# Patient Record
Sex: MALE
[De-identification: names, ages, dates, MRNs, and addresses within clinical notes are randomized per-mention and may not be internally consistent; named-entity substitution may affect disease eponyms.]

---
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
## 2021-06-04 NOTE — XMS
PreManage Notification: SABINE CAMPBELL MRN:[id number]
 
Security Information
 
Security Events
No recent Security Events currently on file
 
 
 
CRITERIA MET
------------
- Group Notification
 
 
CARE PROVIDERS
-------------------------------------------------------------------------------------
MISSAEL HANKINS [facility]     12/18/2019-Current
 
PHONE: [phone]
-------------------------------------------------------------------------------------
 
[facility] has no Care Guidelines for this patient.
Care History
Medical/Surgical
12/18/2019    [facility]
 
      - CHW CALLED AND LEFT PATIENT A VOICEMAIL- PATIENT DOES NOT HAVE A PCP. 
      - PATIENT CALLED BACK- CHW PROVIDED CLINIC INFORMATION IN THE AREA THAT ARE 
      ACCEPTING NEW PATIENTS. PATIENT STATED HE WOULD LOOK INTO IT AND DID NOT WANT
      ASSISTANCE FROM CHW.
01/08/2019    [facility]
 
      - PATIENT DOES NOT HAVE A PCP- 
      - [facility]W IS UNABLE TO CONTACT PATIENT 
      - PLEASE REFER PATIENT TO [facility] IN CLINIC TO ESTABLISH CARE WITH A 
      PROVIDER.
      - NO PCP LETTER HAS BEEN SENT TO PATIENT.
EFelipeD. VISIT COUNT (12 MO.)
-------------------------------------------------------------------------------------
1 [facility]
-------------------------------------------------------------------------------------
TOTAL 1
-------------------------------------------------------------------------------------
NOTE: Visits indicate total known visits.
 
ED/UCC VISIT TRACKING (12 MO.)
-------------------------------------------------------------------------------------
06/04/2021 14:17
[facility] [place]
 
TYPE: Emergency
 
COMPLAINT:
- JAW PAIN
-------------------------------------------------------------------------------------
 
 
INPATIENT VISIT TRACKING (12 MO.)
No inpatient visits to display in this time frame
 
 
[email or web address]

## 2021-06-17 NOTE — XMS
PreManage Notification: SABINE CAMPBELL MRN:[id number]
 
Security Information
 
Security Events
No recent Security Events currently on file
 
 
 
CRITERIA MET
------------
- [facility] - 2 Visits in 30 Days
- Group Notification
 
 
CARE PROVIDERS
-------------------------------------------------------------------------------------
MISSAEL HANKINS [facility]     12/18/2019-Current
 
PHONE: [phone]
-------------------------------------------------------------------------------------
 
[facility] has no Care Guidelines for this patient.
Care History
Medical/Surgical
12/18/2019    [facility]
 
      - CHW CALLED AND LEFT PATIENT A VOICEMAIL- PATIENT DOES NOT HAVE A PCP. 
      - PATIENT CALLED BACK- CHW PROVIDED CLINIC INFORMATION IN THE AREA THAT ARE 
      ACCEPTING NEW PATIENTS. PATIENT STATED HE WOULD LOOK INTO IT AND DID NOT WANT
      ASSISTANCE FROM CHW.
01/08/2019    [facility]
 
      - PATIENT DOES NOT HAVE A PCP- 
      - CHW IS UNABLE TO CONTACT PATIENT 
      - PLEASE REFER PATIENT TO [place] [facility] IN CLINIC TO ESTABLISH CARE WITH A 
      PROVIDER.
      - NO PCP LETTER HAS BEEN SENT TO PATIENT.
E.D. VISIT COUNT (12 MO.)
-------------------------------------------------------------------------------------
2 [facility]
-------------------------------------------------------------------------------------
TOTAL 2
-------------------------------------------------------------------------------------
NOTE: Visits indicate total known visits.
 
ED/C VISIT TRACKING (12 MO.)
-------------------------------------------------------------------------------------
06/17/2021 15:17
[facility] [place]
 
TYPE: Emergency
 
COMPLAINT:
- L SIDED FACE SWELLING
-------------------------------------------------------------------------------------
06/04/2021 14:17
[facility] [place]
 
 
TYPE: Emergency
 
COMPLAINT:
- EAR PN, JAW PAIN
 
DIAGNOSES:
- Chronic sinusitis, unspecified
- Other specified disorders of muscle
-------------------------------------------------------------------------------------
 
 
INPATIENT VISIT TRACKING (12 MO.)
No inpatient visits to display in this time frame
 
[email or web address]

## 2022-02-20 NOTE — XMS
PreManage Notification: SABINE CAMPBELL MRN:[id number]
 
Security Information
 
Security Events
No recent Security Events currently on file
 
 
 
CRITERIA MET
------------
- Group Notification
 
 
CARE PROVIDERS
-------------------------------------------------------------------------------------
MISSAEL HANKINS [facility]     12/18/2019-Current
 
PHONE: Unknown
-------------------------------------------------------------------------------------
 
[facility] has no Care Guidelines for this patient.
Care History
Medical/Surgical
12/18/2019    [facility]
 
      - CHW CALLED AND LEFT PATIENT A VOICEMAIL- PATIENT DOES NOT HAVE A PCP. 
      - PATIENT CALLED BACK- CHW PROVIDED CLINIC INFORMATION IN THE AREA THAT ARE 
      ACCEPTING NEW PATIENTS. PATIENT STATED HE WOULD LOOK INTO IT AND DID NOT WANT
      ASSISTANCE FROM CHW.
01/08/2019    [facility]
 
      - PATIENT DOES NOT HAVE A PCP- 
      - CHW IS UNABLE TO CONTACT PATIENT 
      - PLEASE REFER PATIENT TO [facility] IN CLINIC TO ESTABLISH CARE WITH A 
      PROVIDER.
      - NO PCP LETTER HAS BEEN SENT TO PATIENT.
E.D. VISIT COUNT (12 MO.)
-------------------------------------------------------------------------------------
3 [facility]
-------------------------------------------------------------------------------------
TOTAL 3
-------------------------------------------------------------------------------------
NOTE: Visits indicate total known visits.
 
ED/UCC VISIT TRACKING (12 MO.)
-------------------------------------------------------------------------------------
02/20/2022 14:35
[facility] [place]
 
TYPE: Emergency
 
COMPLAINT:
- DENTAL PROBLEM
-------------------------------------------------------------------------------------
06/17/2021 15:17
[facility] [place]
 
TYPE: Emergency
 
 
COMPLAINT:
- L SIDED FACE SWELLING
 
DIAGNOSES:
- Dental caries, unspecified
- Other specified disorders of teeth and supporting structures
-------------------------------------------------------------------------------------
06/04/2021 14:17
[facility] [place]
 
TYPE: Emergency
 
COMPLAINT:
- EAR PN, JAW PAIN
 
DIAGNOSES:
- Chronic sinusitis, unspecified
- Other specified disorders of muscle
-------------------------------------------------------------------------------------
 
 
INPATIENT VISIT TRACKING (12 MO.)
No inpatient visits to display in this time frame
 
[email or web address]